# Patient Record
Sex: FEMALE | NOT HISPANIC OR LATINO | URBAN - METROPOLITAN AREA
[De-identification: names, ages, dates, MRNs, and addresses within clinical notes are randomized per-mention and may not be internally consistent; named-entity substitution may affect disease eponyms.]

---

## 2021-01-01 ENCOUNTER — INPATIENT (INPATIENT)
Facility: HOSPITAL | Age: 66
LOS: 1 days | DRG: 208 | End: 2021-11-24
Attending: INTERNAL MEDICINE | Admitting: INTERNAL MEDICINE
Payer: MEDICAID

## 2021-01-01 VITALS
SYSTOLIC BLOOD PRESSURE: 184 MMHG | WEIGHT: 235.01 LBS | TEMPERATURE: 98 F | OXYGEN SATURATION: 91 % | DIASTOLIC BLOOD PRESSURE: 74 MMHG | HEART RATE: 79 BPM | RESPIRATION RATE: 22 BRPM

## 2021-01-01 VITALS
SYSTOLIC BLOOD PRESSURE: 146 MMHG | RESPIRATION RATE: 20 BRPM | HEART RATE: 75 BPM | OXYGEN SATURATION: 95 % | TEMPERATURE: 98 F | DIASTOLIC BLOOD PRESSURE: 64 MMHG

## 2021-01-01 DIAGNOSIS — E11.8 TYPE 2 DIABETES MELLITUS WITH UNSPECIFIED COMPLICATIONS: ICD-10-CM

## 2021-01-01 DIAGNOSIS — R06.00 DYSPNEA, UNSPECIFIED: ICD-10-CM

## 2021-01-01 DIAGNOSIS — I10 ESSENTIAL (PRIMARY) HYPERTENSION: ICD-10-CM

## 2021-01-01 DIAGNOSIS — R09.02 HYPOXEMIA: ICD-10-CM

## 2021-01-01 LAB
A1C WITH ESTIMATED AVERAGE GLUCOSE RESULT: 7.1 % — HIGH (ref 4–5.6)
ALBUMIN SERPL ELPH-MCNC: 4 G/DL — SIGNIFICANT CHANGE UP (ref 3.3–5)
ALBUMIN SERPL ELPH-MCNC: 4.3 G/DL — SIGNIFICANT CHANGE UP (ref 3.3–5)
ALBUMIN SERPL ELPH-MCNC: 4.3 G/DL — SIGNIFICANT CHANGE UP (ref 3.3–5)
ALP SERPL-CCNC: 52 U/L — SIGNIFICANT CHANGE UP (ref 40–120)
ALP SERPL-CCNC: 65 U/L — SIGNIFICANT CHANGE UP (ref 40–120)
ALP SERPL-CCNC: 66 U/L — SIGNIFICANT CHANGE UP (ref 40–120)
ALT FLD-CCNC: 37 U/L — SIGNIFICANT CHANGE UP (ref 10–45)
ALT FLD-CCNC: 42 U/L — SIGNIFICANT CHANGE UP (ref 10–45)
ALT FLD-CCNC: 44 U/L — SIGNIFICANT CHANGE UP (ref 10–45)
ANION GAP SERPL CALC-SCNC: 12 MMOL/L — SIGNIFICANT CHANGE UP (ref 5–17)
ANION GAP SERPL CALC-SCNC: 13 MMOL/L — SIGNIFICANT CHANGE UP (ref 5–17)
APTT BLD: 30.9 SEC — SIGNIFICANT CHANGE UP (ref 27.5–35.5)
AST SERPL-CCNC: 114 U/L — HIGH (ref 10–40)
AST SERPL-CCNC: 17 U/L — SIGNIFICANT CHANGE UP (ref 10–40)
AST SERPL-CCNC: 21 U/L — SIGNIFICANT CHANGE UP (ref 10–40)
BASE EXCESS BLDV CALC-SCNC: 3.5 MMOL/L — HIGH (ref -2–2)
BASOPHILS # BLD AUTO: 0.05 K/UL — SIGNIFICANT CHANGE UP (ref 0–0.2)
BASOPHILS NFR BLD AUTO: 0.5 % — SIGNIFICANT CHANGE UP (ref 0–2)
BILIRUB SERPL-MCNC: 0.4 MG/DL — SIGNIFICANT CHANGE UP (ref 0.2–1.2)
BILIRUB SERPL-MCNC: 0.4 MG/DL — SIGNIFICANT CHANGE UP (ref 0.2–1.2)
BILIRUB SERPL-MCNC: 0.5 MG/DL — SIGNIFICANT CHANGE UP (ref 0.2–1.2)
BUN SERPL-MCNC: 14 MG/DL — SIGNIFICANT CHANGE UP (ref 7–23)
BUN SERPL-MCNC: 14 MG/DL — SIGNIFICANT CHANGE UP (ref 7–23)
BUN SERPL-MCNC: 16 MG/DL — SIGNIFICANT CHANGE UP (ref 7–23)
BUN SERPL-MCNC: 24 MG/DL — HIGH (ref 7–23)
CA-I SERPL-SCNC: 1.11 MMOL/L — LOW (ref 1.15–1.33)
CALCIUM SERPL-MCNC: 9.1 MG/DL — SIGNIFICANT CHANGE UP (ref 8.4–10.5)
CALCIUM SERPL-MCNC: 9.5 MG/DL — SIGNIFICANT CHANGE UP (ref 8.4–10.5)
CHLORIDE BLDV-SCNC: 104 MMOL/L — SIGNIFICANT CHANGE UP (ref 96–108)
CHLORIDE SERPL-SCNC: 103 MMOL/L — SIGNIFICANT CHANGE UP (ref 96–108)
CHLORIDE SERPL-SCNC: 103 MMOL/L — SIGNIFICANT CHANGE UP (ref 96–108)
CHLORIDE SERPL-SCNC: 104 MMOL/L — SIGNIFICANT CHANGE UP (ref 96–108)
CHLORIDE SERPL-SCNC: 99 MMOL/L — SIGNIFICANT CHANGE UP (ref 96–108)
CO2 BLDV-SCNC: 32 MMOL/L — HIGH (ref 22–26)
CO2 SERPL-SCNC: 24 MMOL/L — SIGNIFICANT CHANGE UP (ref 22–31)
CO2 SERPL-SCNC: 25 MMOL/L — SIGNIFICANT CHANGE UP (ref 22–31)
CO2 SERPL-SCNC: 26 MMOL/L — SIGNIFICANT CHANGE UP (ref 22–31)
CO2 SERPL-SCNC: 26 MMOL/L — SIGNIFICANT CHANGE UP (ref 22–31)
COVID-19 NUCLEOCAPSID GAM AB INTERP: POSITIVE
COVID-19 NUCLEOCAPSID TOTAL GAM ANTIBODY RESULT: 7.28 INDEX — HIGH
COVID-19 SPIKE DOMAIN AB INTERP: POSITIVE
COVID-19 SPIKE DOMAIN ANTIBODY RESULT: >250 U/ML — HIGH
CREAT SERPL-MCNC: 0.67 MG/DL — SIGNIFICANT CHANGE UP (ref 0.5–1.3)
CREAT SERPL-MCNC: 0.69 MG/DL — SIGNIFICANT CHANGE UP (ref 0.5–1.3)
CREAT SERPL-MCNC: 0.76 MG/DL — SIGNIFICANT CHANGE UP (ref 0.5–1.3)
CREAT SERPL-MCNC: 0.81 MG/DL — SIGNIFICANT CHANGE UP (ref 0.5–1.3)
EOSINOPHIL # BLD AUTO: 0.19 K/UL — SIGNIFICANT CHANGE UP (ref 0–0.5)
EOSINOPHIL NFR BLD AUTO: 2 % — SIGNIFICANT CHANGE UP (ref 0–6)
ESTIMATED AVERAGE GLUCOSE: 157 MG/DL — HIGH (ref 68–114)
GAS PNL BLDV: 136 MMOL/L — SIGNIFICANT CHANGE UP (ref 136–145)
GAS PNL BLDV: SIGNIFICANT CHANGE UP
GAS PNL BLDV: SIGNIFICANT CHANGE UP
GLUCOSE BLDC GLUCOMTR-MCNC: 111 MG/DL — HIGH (ref 70–99)
GLUCOSE BLDC GLUCOMTR-MCNC: 173 MG/DL — HIGH (ref 70–99)
GLUCOSE BLDC GLUCOMTR-MCNC: 191 MG/DL — HIGH (ref 70–99)
GLUCOSE BLDC GLUCOMTR-MCNC: 198 MG/DL — HIGH (ref 70–99)
GLUCOSE BLDC GLUCOMTR-MCNC: 201 MG/DL — HIGH (ref 70–99)
GLUCOSE BLDC GLUCOMTR-MCNC: 215 MG/DL — HIGH (ref 70–99)
GLUCOSE BLDC GLUCOMTR-MCNC: 231 MG/DL — HIGH (ref 70–99)
GLUCOSE BLDC GLUCOMTR-MCNC: 233 MG/DL — HIGH (ref 70–99)
GLUCOSE BLDV-MCNC: 160 MG/DL — HIGH (ref 70–99)
GLUCOSE SERPL-MCNC: 131 MG/DL — HIGH (ref 70–99)
GLUCOSE SERPL-MCNC: 155 MG/DL — HIGH (ref 70–99)
GLUCOSE SERPL-MCNC: 175 MG/DL — HIGH (ref 70–99)
GLUCOSE SERPL-MCNC: 192 MG/DL — HIGH (ref 70–99)
HCO3 BLDV-SCNC: 30 MMOL/L — HIGH (ref 22–29)
HCT VFR BLD CALC: 39 % — SIGNIFICANT CHANGE UP (ref 34.5–45)
HCT VFR BLD CALC: 39.7 % — SIGNIFICANT CHANGE UP (ref 34.5–45)
HCT VFR BLDA CALC: 34 % — LOW (ref 34.5–46.5)
HCV AB S/CO SERPL IA: 0.1 S/CO — SIGNIFICANT CHANGE UP (ref 0–0.99)
HCV AB SERPL-IMP: SIGNIFICANT CHANGE UP
HGB BLD CALC-MCNC: 11.3 G/DL — LOW (ref 11.7–16.1)
HGB BLD-MCNC: 11.2 G/DL — LOW (ref 11.5–15.5)
HGB BLD-MCNC: 11.2 G/DL — LOW (ref 11.5–15.5)
IMM GRANULOCYTES NFR BLD AUTO: 0.7 % — SIGNIFICANT CHANGE UP (ref 0–1.5)
INR BLD: 1.11 RATIO — SIGNIFICANT CHANGE UP (ref 0.88–1.16)
LACTATE BLDV-MCNC: 2 MMOL/L — SIGNIFICANT CHANGE UP (ref 0.7–2)
LYMPHOCYTES # BLD AUTO: 1.71 K/UL — SIGNIFICANT CHANGE UP (ref 1–3.3)
LYMPHOCYTES # BLD AUTO: 18 % — SIGNIFICANT CHANGE UP (ref 13–44)
MAGNESIUM SERPL-MCNC: 2.2 MG/DL — SIGNIFICANT CHANGE UP (ref 1.6–2.6)
MCHC RBC-ENTMCNC: 22.6 PG — LOW (ref 27–34)
MCHC RBC-ENTMCNC: 22.9 PG — LOW (ref 27–34)
MCHC RBC-ENTMCNC: 28.2 GM/DL — LOW (ref 32–36)
MCHC RBC-ENTMCNC: 28.7 GM/DL — LOW (ref 32–36)
MCV RBC AUTO: 78.6 FL — LOW (ref 80–100)
MCV RBC AUTO: 81 FL — SIGNIFICANT CHANGE UP (ref 80–100)
MONOCYTES # BLD AUTO: 0.65 K/UL — SIGNIFICANT CHANGE UP (ref 0–0.9)
MONOCYTES NFR BLD AUTO: 6.8 % — SIGNIFICANT CHANGE UP (ref 2–14)
NEUTROPHILS # BLD AUTO: 6.83 K/UL — SIGNIFICANT CHANGE UP (ref 1.8–7.4)
NEUTROPHILS NFR BLD AUTO: 72 % — SIGNIFICANT CHANGE UP (ref 43–77)
NRBC # BLD: 0 /100 WBCS — SIGNIFICANT CHANGE UP (ref 0–0)
NRBC # BLD: 0 /100 WBCS — SIGNIFICANT CHANGE UP (ref 0–0)
NT-PROBNP SERPL-SCNC: 165 PG/ML — SIGNIFICANT CHANGE UP (ref 0–300)
PCO2 BLDV: 53 MMHG — HIGH (ref 39–42)
PH BLDV: 7.36 — SIGNIFICANT CHANGE UP (ref 7.32–7.43)
PLATELET # BLD AUTO: 278 K/UL — SIGNIFICANT CHANGE UP (ref 150–400)
PLATELET # BLD AUTO: 280 K/UL — SIGNIFICANT CHANGE UP (ref 150–400)
PO2 BLDV: 42 MMHG — SIGNIFICANT CHANGE UP (ref 25–45)
POTASSIUM BLDV-SCNC: 7.4 MMOL/L — CRITICAL HIGH (ref 3.5–5.1)
POTASSIUM SERPL-MCNC: 3.5 MMOL/L — SIGNIFICANT CHANGE UP (ref 3.5–5.3)
POTASSIUM SERPL-MCNC: 4 MMOL/L — SIGNIFICANT CHANGE UP (ref 3.5–5.3)
POTASSIUM SERPL-MCNC: 4 MMOL/L — SIGNIFICANT CHANGE UP (ref 3.5–5.3)
POTASSIUM SERPL-MCNC: >9 MMOL/L — CRITICAL HIGH (ref 3.5–5.3)
POTASSIUM SERPL-SCNC: 3.5 MMOL/L — SIGNIFICANT CHANGE UP (ref 3.5–5.3)
POTASSIUM SERPL-SCNC: 4 MMOL/L — SIGNIFICANT CHANGE UP (ref 3.5–5.3)
POTASSIUM SERPL-SCNC: 4 MMOL/L — SIGNIFICANT CHANGE UP (ref 3.5–5.3)
POTASSIUM SERPL-SCNC: >9 MMOL/L — CRITICAL HIGH (ref 3.5–5.3)
PROT SERPL-MCNC: 7.4 G/DL — SIGNIFICANT CHANGE UP (ref 6–8.3)
PROT SERPL-MCNC: 7.8 G/DL — SIGNIFICANT CHANGE UP (ref 6–8.3)
PROT SERPL-MCNC: 8.6 G/DL — HIGH (ref 6–8.3)
PROTHROM AB SERPL-ACNC: 13.2 SEC — SIGNIFICANT CHANGE UP (ref 10.6–13.6)
RAPID RVP RESULT: SIGNIFICANT CHANGE UP
RBC # BLD: 4.9 M/UL — SIGNIFICANT CHANGE UP (ref 3.8–5.2)
RBC # BLD: 4.96 M/UL — SIGNIFICANT CHANGE UP (ref 3.8–5.2)
RBC # FLD: 17.1 % — HIGH (ref 10.3–14.5)
RBC # FLD: 17.2 % — HIGH (ref 10.3–14.5)
SAO2 % BLDV: 66.9 % — LOW (ref 67–88)
SARS-COV-2 IGG+IGM SERPL QL IA: 7.28 INDEX — HIGH
SARS-COV-2 IGG+IGM SERPL QL IA: >250 U/ML — HIGH
SARS-COV-2 IGG+IGM SERPL QL IA: POSITIVE
SARS-COV-2 IGG+IGM SERPL QL IA: POSITIVE
SARS-COV-2 RNA SPEC QL NAA+PROBE: SIGNIFICANT CHANGE UP
SARS-COV-2 RNA SPEC QL NAA+PROBE: SIGNIFICANT CHANGE UP
SODIUM SERPL-SCNC: 135 MMOL/L — SIGNIFICANT CHANGE UP (ref 135–145)
SODIUM SERPL-SCNC: 142 MMOL/L — SIGNIFICANT CHANGE UP (ref 135–145)
TROPONIN T, HIGH SENSITIVITY RESULT: 11 NG/L — SIGNIFICANT CHANGE UP (ref 0–51)
TROPONIN T, HIGH SENSITIVITY RESULT: 8 NG/L — SIGNIFICANT CHANGE UP (ref 0–51)
WBC # BLD: 9.34 K/UL — SIGNIFICANT CHANGE UP (ref 3.8–10.5)
WBC # BLD: 9.5 K/UL — SIGNIFICANT CHANGE UP (ref 3.8–10.5)
WBC # FLD AUTO: 9.34 K/UL — SIGNIFICANT CHANGE UP (ref 3.8–10.5)
WBC # FLD AUTO: 9.5 K/UL — SIGNIFICANT CHANGE UP (ref 3.8–10.5)

## 2021-01-01 PROCEDURE — 71045 X-RAY EXAM CHEST 1 VIEW: CPT | Mod: 26

## 2021-01-01 PROCEDURE — 99285 EMERGENCY DEPT VISIT HI MDM: CPT

## 2021-01-01 RX ORDER — INSULIN LISPRO 100/ML
VIAL (ML) SUBCUTANEOUS
Refills: 0 | Status: DISCONTINUED | OUTPATIENT
Start: 2021-01-01 | End: 2021-01-01

## 2021-01-01 RX ORDER — SODIUM CHLORIDE 9 MG/ML
1000 INJECTION, SOLUTION INTRAVENOUS
Refills: 0 | Status: DISCONTINUED | OUTPATIENT
Start: 2021-01-01 | End: 2021-01-01

## 2021-01-01 RX ORDER — SPIRONOLACTONE 25 MG/1
25 TABLET, FILM COATED ORAL DAILY
Refills: 0 | Status: DISCONTINUED | OUTPATIENT
Start: 2021-01-01 | End: 2021-01-01

## 2021-01-01 RX ORDER — DEXTROSE 50 % IN WATER 50 %
25 SYRINGE (ML) INTRAVENOUS ONCE
Refills: 0 | Status: DISCONTINUED | OUTPATIENT
Start: 2021-01-01 | End: 2021-01-01

## 2021-01-01 RX ORDER — GLUCAGON INJECTION, SOLUTION 0.5 MG/.1ML
1 INJECTION, SOLUTION SUBCUTANEOUS ONCE
Refills: 0 | Status: DISCONTINUED | OUTPATIENT
Start: 2021-01-01 | End: 2021-01-01

## 2021-01-01 RX ORDER — LOSARTAN POTASSIUM 100 MG/1
100 TABLET, FILM COATED ORAL DAILY
Refills: 0 | Status: DISCONTINUED | OUTPATIENT
Start: 2021-01-01 | End: 2021-01-01

## 2021-01-01 RX ORDER — FUROSEMIDE 40 MG
40 TABLET ORAL DAILY
Refills: 0 | Status: DISCONTINUED | OUTPATIENT
Start: 2021-01-01 | End: 2021-01-01

## 2021-01-01 RX ORDER — SPIRONOLACTONE 25 MG/1
1 TABLET, FILM COATED ORAL
Qty: 0 | Refills: 0 | DISCHARGE

## 2021-01-01 RX ORDER — DEXTROSE 50 % IN WATER 50 %
15 SYRINGE (ML) INTRAVENOUS ONCE
Refills: 0 | Status: DISCONTINUED | OUTPATIENT
Start: 2021-01-01 | End: 2021-01-01

## 2021-01-01 RX ORDER — ATENOLOL 25 MG/1
1 TABLET ORAL
Qty: 0 | Refills: 0 | DISCHARGE

## 2021-01-01 RX ORDER — ATENOLOL 25 MG/1
100 TABLET ORAL DAILY
Refills: 0 | Status: DISCONTINUED | OUTPATIENT
Start: 2021-01-01 | End: 2021-01-01

## 2021-01-01 RX ORDER — EMPAGLIFLOZIN 10 MG/1
1 TABLET, FILM COATED ORAL
Qty: 0 | Refills: 0 | DISCHARGE

## 2021-01-01 RX ORDER — CALCIUM GLUCONATE 100 MG/ML
2 VIAL (ML) INTRAVENOUS ONCE
Refills: 0 | Status: DISCONTINUED | OUTPATIENT
Start: 2021-01-01 | End: 2021-01-01

## 2021-01-01 RX ORDER — DEXTROSE 50 % IN WATER 50 %
12.5 SYRINGE (ML) INTRAVENOUS ONCE
Refills: 0 | Status: DISCONTINUED | OUTPATIENT
Start: 2021-01-01 | End: 2021-01-01

## 2021-01-01 RX ORDER — INFLUENZA VIRUS VACCINE 15; 15; 15; 15 UG/.5ML; UG/.5ML; UG/.5ML; UG/.5ML
0.7 SUSPENSION INTRAMUSCULAR ONCE
Refills: 0 | Status: DISCONTINUED | OUTPATIENT
Start: 2021-01-01 | End: 2021-01-01

## 2021-01-01 RX ORDER — HEPARIN SODIUM 5000 [USP'U]/ML
5000 INJECTION INTRAVENOUS; SUBCUTANEOUS EVERY 12 HOURS
Refills: 0 | Status: DISCONTINUED | OUTPATIENT
Start: 2021-01-01 | End: 2021-01-01

## 2021-01-01 RX ORDER — LOSARTAN POTASSIUM 100 MG/1
1 TABLET, FILM COATED ORAL
Qty: 0 | Refills: 0 | DISCHARGE

## 2021-01-01 RX ORDER — VITAMIN E 100 UNIT
0 CAPSULE ORAL
Qty: 0 | Refills: 0 | DISCHARGE

## 2021-01-01 RX ORDER — INSULIN LISPRO 100/ML
VIAL (ML) SUBCUTANEOUS AT BEDTIME
Refills: 0 | Status: DISCONTINUED | OUTPATIENT
Start: 2021-01-01 | End: 2021-01-01

## 2021-01-01 RX ORDER — SITAGLIPTIN AND METFORMIN HYDROCHLORIDE 500; 50 MG/1; MG/1
2 TABLET, FILM COATED ORAL
Qty: 0 | Refills: 0 | DISCHARGE

## 2021-01-01 RX ORDER — LEVOTHYROXINE SODIUM 125 MCG
125 TABLET ORAL DAILY
Refills: 0 | Status: DISCONTINUED | OUTPATIENT
Start: 2021-01-01 | End: 2021-01-01

## 2021-01-01 RX ORDER — ALBUTEROL 90 UG/1
2 AEROSOL, METERED ORAL
Refills: 0 | Status: DISCONTINUED | OUTPATIENT
Start: 2021-01-01 | End: 2021-01-01

## 2021-01-01 RX ORDER — LEVOTHYROXINE SODIUM 125 MCG
0.5 TABLET ORAL
Qty: 0 | Refills: 0 | DISCHARGE

## 2021-01-01 RX ADMIN — HEPARIN SODIUM 5000 UNIT(S): 5000 INJECTION INTRAVENOUS; SUBCUTANEOUS at 17:34

## 2021-01-01 RX ADMIN — Medication 0.1 MILLIGRAM(S): at 05:37

## 2021-01-01 RX ADMIN — ATENOLOL 100 MILLIGRAM(S): 25 TABLET ORAL at 05:06

## 2021-01-01 RX ADMIN — LOSARTAN POTASSIUM 100 MILLIGRAM(S): 100 TABLET, FILM COATED ORAL at 05:37

## 2021-01-01 RX ADMIN — Medication 1: at 18:15

## 2021-01-01 RX ADMIN — SPIRONOLACTONE 25 MILLIGRAM(S): 25 TABLET, FILM COATED ORAL at 05:38

## 2021-01-01 RX ADMIN — SPIRONOLACTONE 25 MILLIGRAM(S): 25 TABLET, FILM COATED ORAL at 05:06

## 2021-01-01 RX ADMIN — ATENOLOL 100 MILLIGRAM(S): 25 TABLET ORAL at 05:38

## 2021-01-01 RX ADMIN — HEPARIN SODIUM 5000 UNIT(S): 5000 INJECTION INTRAVENOUS; SUBCUTANEOUS at 05:38

## 2021-01-01 RX ADMIN — HEPARIN SODIUM 5000 UNIT(S): 5000 INJECTION INTRAVENOUS; SUBCUTANEOUS at 05:05

## 2021-01-01 RX ADMIN — Medication 2: at 13:40

## 2021-01-01 RX ADMIN — Medication 125 MICROGRAM(S): at 05:06

## 2021-01-01 RX ADMIN — Medication 0.1 MILLIGRAM(S): at 05:06

## 2021-01-01 RX ADMIN — Medication 40 MILLIGRAM(S): at 05:06

## 2021-01-01 RX ADMIN — Medication 40 MILLIGRAM(S): at 17:32

## 2021-01-01 RX ADMIN — LOSARTAN POTASSIUM 100 MILLIGRAM(S): 100 TABLET, FILM COATED ORAL at 05:06

## 2021-01-01 RX ADMIN — Medication 1: at 09:20

## 2021-01-01 RX ADMIN — Medication 1: at 09:26

## 2021-01-01 RX ADMIN — Medication 2: at 13:19

## 2021-01-01 RX ADMIN — Medication 125 MICROGRAM(S): at 05:37

## 2021-11-22 NOTE — ED PROVIDER NOTE - ATTENDING CONTRIBUTION TO CARE
66 F w/ breast malignancy s/p resection,   sudden onset sob, w/ cp   88 percent  2 puffs of albuterol and then sat improved  no cp    bilateral trace edema 66 F w/ L breast malignancy s/p resection, HTN, DM follows w NP Barbara wu [facility], here w/ an episode of sudden onset sob w/ cp pt states that she was about to get in an suv and was having   88 percent  2 puffs of albuterol and then sat improved  no cp    bilateral trace edema 66 F w/ L breast malignancy s/p resection, HTN, DM follows w NP Barbara w [facility], here w/ an episode of sudden onset sob w/ cp pt states that she was about to get in an suv and then had worsening symptoms. Pt denies any nausea no vomiting. No fevers, no chills, no lightheadedness,    88 percent   2 puffs of albuterol and then sat improved to the 90s, pt w/ initially having tachypnea and sob.   on exam, Const: appearing stated age, no acute distress Head: atraumatic, normocephalic  Eyes: no conjunctival injection and no scleral icterus ENMT: Atraumatic external nose and ears, Moist mucus membranes Neck: Symmetric, trachea midline, BACK: no bruising, CVS: +S1/S2, radial pulse 2+ bilaterally RESP: wheezing in the JONATHAN, w/ Unlabored respiratory effort, Clear to auscultation bilaterally GI: Nontender/Nondistended, soft abdomen MSK: Extremities w/o deformity or ttp w/ bilateral trace edema  Skin: Warm, Dry w/ no rashes Neuro: GCS=15, motor in all 4 extremities equal, Sensation grossly intact Psych: Awake, Alert, & Orientedx3;  Appropriate mood and affect, cooperative   Plan for labs imaging and reassessment. Pt w/ findings concerning for chf vs less likely PE vs reactive airway disease, pt hypoxic on RA, to be admitted to medicine for further management of sx eval for acs,  states she hasn't had an echo done recently, no prior stress test.

## 2021-11-22 NOTE — ED PROVIDER NOTE - NS ED ROS FT
GENERAL: No fever or chills  EYES: No change in vision  HEENT: No trouble swallowing or speaking  CARDIAC: +palpitations  PULMONARY: +SOB, hypoxia  GI: No abdominal pain, no nausea or no vomiting, no diarrhea or constipation  : No changes in urination  SKIN: No rashes  NEURO: No headache, no numbness  MSK: No joint pain  Otherwise as HPI or negative.

## 2021-11-22 NOTE — ED PROVIDER NOTE - CLINICAL SUMMARY MEDICAL DECISION MAKING FREE TEXT BOX
Cecil Singer MD (PGY-2): The patient is a 66y Female with pmhx of breast malignancy with L mastectomy, HTN, DM2 complaining of sudden onset shortness of breath and hypoxia. DDx Cecil Singer MD (PGY-2): The patient is a 66y Female with pmhx of breast malignancy with L mastectomy, HTN, DM2 complaining of sudden onset shortness of breath and hypoxia. DDx: new onset CHF, PE, reactive airway disease. ekg, cxr, trop, d-dimer, bnp, labs, oxygen via NC. Pt hypoxic on RA. Satting 96% on 2L O2 via NC. Pt will most likely be admitted.

## 2021-11-22 NOTE — ED PROVIDER NOTE - PHYSICAL EXAMINATION
Gen: NAD. A&Ox4. Non-toxic appearing.  HEENT: Normocephalic and atraumatic. PERRL, EOMI, no nasal discharge, mucous membranes dry, no scleral icterus.  CV: Regular rate and rhythm. +S1/S2, no M/R/G. Trace pedal/ankle edema bilaterally. Radial and DP pulses present and symmetrical. Capillary refill less than 2 seconds.  Resp: Mildly increased effort and rate (low 20s). CTAB, no rales, rhonchi, or wheezes.  GI: Abdomen soft, non-distended, non tender to palpation. No masses appreciated. Bowel sounds present.  MSK: No open wounds and no bruising. No CVAT bilaterally.  Neuro: Following commands, speaking in full sentences, moving extremities spontaneously  Psych: Appropriate mood, cooperative

## 2021-11-22 NOTE — ED PROVIDER NOTE - OBJECTIVE STATEMENT
The patient is a 66y Female with pmhx of breast malignancy with L mastectomy, HTN, DM2 complaining of sudden onset shortness of breath. Around 3 hours ago, pt was getting into SUV to go to airport, suddenly felt SOB with heart palpitations, but denied CP at the time. Pulse ox at the time was 88%. Her nephew came over and saw that pulse ox was 78%. Gave her 2 puffs of albuterol which improved pulse ox to 88. Pt not on any baseline O2 at home. Has hx of asthma, but hasn't had to take medicine for years. No hx of CHF, DVT/PE. Denies any leg swelling. Said her last echo was 2-3 years ago, was told heart function was normal. Doesn't have cardiologist at this time. Denies any current CP, sob, palpitations. Allergic to Norvasc and hydralazine. The patient is a 66y Female with pmhx of breast malignancy with L mastectomy, HTN, DM2 complaining of sudden onset shortness of breath and hypoxia. Around 3 hours ago, pt was getting into SUV to go to airport, suddenly felt SOB with heart palpitations, but denied CP at the time. Pulse ox at the time was 88%. Her nephew came over and saw that pulse ox was 78%. Gave her 2 puffs of albuterol which improved pulse ox to 88. Pt not on any baseline O2 at home. Has hx of asthma, but hasn't had to take medicine for years. No hx of CHF, DVT/PE. Denies any leg swelling. Said her last echo was 2-3 years ago, was told heart function was normal. Doesn't have cardiologist at this time. Denies any current CP, sob, palpitations. Allergic to Norvasc and hydralazine.

## 2021-11-22 NOTE — ED ADULT NURSE NOTE - OBJECTIVE STATEMENT
66 y f came to the ed c/o shortness of breath. patient states around 630 this morning she started to feel sob which gradually got worse. last week her MD stopped her bumex due to dehydration as per patient. sob is worse with activity but improves with rest. patient is a/ox3. denies fevers, chills, chest pain. abdomen is soft and nontender. no lower extremity edema noticed on exam. skin is warm and dry.

## 2021-11-22 NOTE — H&P ADULT - HISTORY OF PRESENT ILLNESS
The patient is a 66y Female with pmhx of breast malignancy with L mastectomy, HTN, DM2 complaining of sudden onset shortness of breath and hypoxia. Around 3 hours ago, pt was getting into SUV [place] go to airport, suddenly felt SOB with heart palpitations, but denied CP at the time. Pulse ox at the time was 88%. Her nephew came over and saw that pulse ox was 78%. Gave her 2 puffs of albuterol which improved pulse ox to 88. Pt not on any baseline O2 at home. Has hx of asthma, but hasn't had to take medicine for years. No hx of CHF, DVT/PE. Denies any leg swelling. Said her last echo was 2-3 years ago, was told heart function was normal.

## 2021-11-22 NOTE — ED ADULT NURSE NOTE - NS ED NOTE  TALK SOMEONE YN
Detail Level: Detailed Wound Evaluated By: Bentley Diehl M.D. Body Location Override (Optional - Billing Will Still Be Based On Selected Body Map Location If Applicable): right upper medial back No

## 2021-11-23 NOTE — PROGRESS NOTE ADULT - SUBJECTIVE AND OBJECTIVE BOX
Patient is a 66y old  Female who presents with a chief complaint of     HPI:  Exertional dyspnea ++, with hypoxia    PAST MEDICAL & SURGICAL HISTORY:      Review of Systems:   CONSTITUTIONAL: No fever, weight loss, or fatigue  EYES: No eye pain, visual disturbances, or discharge  ENMT:  No difficulty hearing, tinnitus, vertigo; No sinus or throat pain  NECK: No pain or stiffness  BREASTS: No pain, masses, or nipple discharge  RESPIRATORY: No cough, wheezing, chills or hemoptysis; +shortness of breath  CARDIOVASCULAR: No chest pain, palpitations, dizziness, or leg swelling  GASTROINTESTINAL: No abdominal or epigastric pain. No nausea, vomiting, or hematemesis; No diarrhea or constipation. No melena or hematochezia.  GENITOURINARY: No dysuria, frequency, hematuria, or incontinence  NEUROLOGICAL: No headaches, memory loss, loss of strength, numbness, or tremors  SKIN: No itching, burning, rashes, or lesions   LYMPH NODES: No enlarged glands  ENDOCRINE: No heat or cold intolerance; No hair loss  MUSCULOSKELETAL: No joint pain or swelling; No muscle, back, or extremity pain  PSYCHIATRIC: No depression, anxiety, mood swings, or difficulty sleeping  HEME/LYMPH: No easy bruising, or bleeding gums  ALLERY AND IMMUNOLOGIC: No hives or eczema    Allergies    hydrALAZINE (Unknown)  Norvasc (Unknown)    Intolerances        Social History:     FAMILY HISTORY:      MEDICATIONS  (STANDING):  ATENolol  Tablet 100 milliGRAM(s) Oral daily  cloNIDine 0.1 milliGRAM(s) Oral daily  dextrose 40% Gel 15 Gram(s) Oral once  dextrose 5%. 1000 milliLiter(s) (50 mL/Hr) IV Continuous <Continuous>  dextrose 5%. 1000 milliLiter(s) (100 mL/Hr) IV Continuous <Continuous>  dextrose 50% Injectable 25 Gram(s) IV Push once  dextrose 50% Injectable 12.5 Gram(s) IV Push once  dextrose 50% Injectable 25 Gram(s) IV Push once  furosemide   Injectable 40 milliGRAM(s) IV Push daily  glucagon  Injectable 1 milliGRAM(s) IntraMuscular once  heparin   Injectable 5000 Unit(s) SubCutaneous every 12 hours  influenza  Vaccine (HIGH DOSE) 0.7 milliLiter(s) IntraMuscular once  insulin lispro (ADMELOG) corrective regimen sliding scale   SubCutaneous three times a day before meals  insulin lispro (ADMELOG) corrective regimen sliding scale   SubCutaneous at bedtime  levothyroxine 125 MICROGram(s) Oral daily  losartan 100 milliGRAM(s) Oral daily  spironolactone 25 milliGRAM(s) Oral daily    MEDICATIONS  (PRN):  ALBUTerol    90 MICROgram(s) HFA Inhaler 2 Puff(s) Inhalation four times a day PRN Shortness of Breath and/or Wheezing        CAPILLARY BLOOD GLUCOSE  220 (22 Nov 2021 21:20)      POCT Blood Glucose.: 198 mg/dL (23 Nov 2021 17:20)  POCT Blood Glucose.: 215 mg/dL (23 Nov 2021 13:09)  POCT Blood Glucose.: 173 mg/dL (23 Nov 2021 08:54)  POCT Blood Glucose.: 201 mg/dL (22 Nov 2021 21:18)    I&O's Summary    22 Nov 2021 07:01  -  23 Nov 2021 07:00  --------------------------------------------------------  IN: 0 mL / OUT: 450 mL / NET: -450 mL    23 Nov 2021 07:01  -  23 Nov 2021 20:13  --------------------------------------------------------  IN: 600 mL / OUT: 0 mL / NET: 600 mL        PHYSICAL EXAM:  Vital Signs Last 24 Hrs  T(C): 36.3 (23 Nov 2021 12:07), Max: 36.7 (23 Nov 2021 04:23)  T(F): 97.4 (23 Nov 2021 12:07), Max: 98 (23 Nov 2021 04:23)  HR: 69 (23 Nov 2021 16:39) (67 - 70)  BP: 123/77 (23 Nov 2021 16:39) (120/76 - 160/70)  BP(mean): --  RR: 20 (23 Nov 2021 12:07) (18 - 20)  SpO2: 92% (23 Nov 2021 12:07) (92% - 97%)    GENERAL: NAD, well-developed  HEAD:  Atraumatic, Normocephalic  EYES: EOMI, PERRLA, conjunctiva and sclera clear  NECK: Supple, No JVD  CHEST/LUNG: Clear to auscultation bilaterally; No wheeze  HEART: Regular rate and rhythm; No murmurs, rubs, or gallops  ABDOMEN: Soft, Nontender, Nondistended; Bowel sounds present  EXTREMITIES:  2+ Peripheral Pulses, No clubbing, cyanosis, or edema  PSYCH: AAOx3  NEUROLOGY: non-focal  SKIN: No rashes or lesions    LABS:                        11.2   9.34  )-----------( 280      ( 23 Nov 2021 06:42 )             39.7     11-23    142  |  104  |  14  ----------------------------<  175<H>  4.0   |  25  |  0.76    Ca    9.5      23 Nov 2021 06:42  Phos  4.0     11-22  Mg     2.0     11-22    TPro  7.4  /  Alb  4.0  /  TBili  0.4  /  DBili  x   /  AST  17  /  ALT  37  /  AlkPhos  66  11-23    PT/INR - ( 22 Nov 2021 10:18 )   PT: 13.2 sec;   INR: 1.11 ratio         PTT - ( 22 Nov 2021 10:18 )  PTT:30.9 sec          RADIOLOGY & ADDITIONAL TESTS:    Imaging Personally Reviewed:    Consultant(s) Notes Reviewed:      Care Discussed with Consultants/Other Providers:

## 2021-11-24 NOTE — PHYSICAL THERAPY INITIAL EVALUATION ADULT - PERTINENT HX OF CURRENT PROBLEM, REHAB EVAL
66y Female with pmhx of breast malignancy with L mastectomy, HTN, DM2 complaining of sudden onset shortness of breath and hypoxia. In afternoon on 11/22/21 pt was getting into SUV [place] go to airport, suddenly felt SOB with heart palpitations, but denied CP at the time. Pulse ox at the time was 88%. Her nephew came over and saw that pulse ox was 78%. Gave her 2 puffs of albuterol which improved pulse ox to 88. Pt not on any baseline O2 at home.

## 2021-11-24 NOTE — PHYSICAL THERAPY INITIAL EVALUATION ADULT - PLANNED THERAPY INTERVENTIONS, PT EVAL
LTG 1: Stairs - Pt will be independent with negotiation of 4 steps w/ handrail within 4 weeks./balance training/bed mobility training/gait training/transfer training

## 2021-11-24 NOTE — DISCHARGE NOTE FOR THE EXPIRED PATIENT - HOSPITAL COURSE
66y Female with pmhx of breast malignancy with L mastectomy, HTN, DM2 complaining of sudden onset shortness of breath and hypoxia. Around 3 hours prior to admision, pt was getting into SUV to go to airport, suddenly felt SOB with heart palpitations, but denied CP at that time. Pulse ox at the time was 88%. Her nephew came over and saw that pulse ox was 78%. Gave her 2 puffs of albuterol which improved pulse ox to 88. Pt not on any baseline O2 at home. Has hx of asthma, but hasn't had to take medicine for years. No hx of CHF, DVT/PE. Denies any leg swelling. Said her last echo was 2-3 years ago, was told heart function was normal. Chest xray - clear lungs. Patient seen by pulm - d dimer is negative: she is not wheezing: but she has hx of asthma: not on regular meds at home; her VBG suggests OHS: She also has ISAEL but have not been using cpap at home for years!  probnp is low too: Echo and LE dopplers ordered, CT chest ordered. Patient ambulating to stretcher, become SOB, urinated and LOC with loss of pulse. CPR initiated and code blue called, unable to achieve ROSC. Patient pronounced at 1709 by Dr Scales

## 2021-11-24 NOTE — PHYSICAL THERAPY INITIAL EVALUATION ADULT - IMPAIRMENTS CONTRIBUTING TO GAIT DEVIATIONS, PT EVAL
Inpatient Anticoagulation Service Note    Date: 10/2/2020    Reason for Anticoagulation: Deep Vein Thrombosis, Pulmonary Embolism   Target INR: 2.0 to 3.0  [id number] VASc Score: 4  HAS-BLED Score: 4   Hemoglobin Value: 15  Hematocrit Value: 44.9  Lab Platelet Value: 335    INR from last 7 days     Date/Time INR Value    10/02/20 0424  (!) 1.69    10/01/20 1630  (!) 1.83        Dose from last 7 days     Date/Time Dose (mg)    10/02/20 6655  5        Significant Interactions: Statin, Aspirin, Thyroid Medications  Bridge Therapy: No (If less than 5 days and overlap therapy discontinued -- document reason (i.e. Bleed Risk))    (If still on overlap therapy, if No -- document reason (i.e. Bleed Risk))    Reversal Agent Administered: Not Applicable  Comments: INR 1.69 ,goal 2-3. Patient received bolus dose yesterday. Expect to see INR trend up in the next few days. No overt s/sx of bleeding noted in chart. DDI noted with home medications. Will give warfarin 7.5 mg PO X 1 with repeat INR in AM.    Plan:  Warfarin 7.5 mg PO X 1, repeat INR in AM  Education Material Provided?: No  Pharmacist suggested discharge dosing: Resume home dosing of warfarin 7.5 mg PO M,W,F and 3.75 mg ROW. Would recommend repeat INR w/i 48 hours of discharge     Renay Salcido, PharmD, BCPS             decreased strength

## 2021-11-24 NOTE — PROVIDER CONTACT NOTE (CHANGE IN STATUS NOTIFICATION) - SITUATION
[facility] No pulse upon assessment, CODE BLUE CALLED.

## 2021-11-24 NOTE — ED ADULT NURSE NOTE - NS ED NURSE REPORT GIVEN DT
22-Nov-2021 17:27 Methotrexate Pregnancy And Lactation Text: This medication is Pregnancy Category X and is known to cause fetal harm. This medication is excreted in breast milk.

## 2021-11-24 NOTE — DISCHARGE NOTE FOR THE EXPIRED PATIENT - NS EXPIRED ORGANCONFIRMRES
Detail Level: Detailed Quality 110: Preventive Care And Screening: Influenza Immunization: Influenza immunization was not ordered or administered, reason not given Quality 431: Preventive Care And Screening: Unhealthy Alcohol Use - Screening: Patient screened for unhealthy alcohol use using a single question and scores less than 2 times per year Yes

## 2021-11-24 NOTE — CONSULT NOTE ADULT - SUBJECTIVE AND OBJECTIVE BOX
11-24-21 @ 09:57    Patient is a 66y old  Female who presents with a chief complaint of     HPI:  The patient is a 66y Female with pmhx of breast malignancy with L mastectomy, HTN, DM2 complaining of sudden onset shortness of breath and hypoxia. Around 3 hours ago, pt was getting into SUV to go to airport, suddenly felt SOB with heart palpitations, but denied CP at the time. Pulse ox at the time was 88%. Her nephew came over and saw that pulse ox was 78%. Gave her 2 puffs of albuterol which improved pulse ox to 88. Pt not on any baseline O2 at home. Has hx of asthma, but hasn't had to take medicine for years. No hx of CHF, DVT/PE. Denies any leg swelling. Said her last echo was 2-3 years ago, was told heart function was normal.  (22 Nov 2021 23:16)    she says she has asthma but have not been taking any inhalers at home; she had no chest pain ; has some dry cough : not much of phlegm:   she has sleep apnea: used to use cpap before but now she has no tbeen using it: she does not use any oxygen at home: she is obese:  and has cough with phlegm but cant tell me what color is the phlegm!    never had pe or dvt : never had covid: got covid vaccinationx         ?FOLLOWING PRESENT  [ x] Hx of PE/DVT, [ ] Hx COPD, [ x] Hx of Asthma, [x ] Hx of Hospitalization, [y ]  Hx of BiPAP/CPAP use, [ y] Hx of ISAEL    Allergies    hydrALAZINE (Unknown)  Norvasc (Unknown)    Intolerances        PAST MEDICAL & SURGICAL HISTORY:      FAMILY HISTORY:      Social History: [ x ] TOBACCO                  [ x ] ETOH                                 [ x ] IVDA/DRUGS    REVIEW OF SYSTEMS      General:	x    Skin/Breast:x  	  Ophthalmologic:x  	  ENMT:	x    Respiratory and Thorax: sob, alarcon , cough   	  Cardiovascular:	x    Gastrointestinal:	x    Genitourinary:	x    Musculoskeletal:	x    Neurological:	x    Psychiatric:	x    Hematology/Lymphatics:	x    Endocrine:	x    Allergic/Immunologic:	x    MEDICATIONS  (STANDING):  ATENolol  Tablet 100 milliGRAM(s) Oral daily  cloNIDine 0.1 milliGRAM(s) Oral daily  dextrose 40% Gel 15 Gram(s) Oral once  dextrose 5%. 1000 milliLiter(s) (50 mL/Hr) IV Continuous <Continuous>  dextrose 5%. 1000 milliLiter(s) (100 mL/Hr) IV Continuous <Continuous>  dextrose 50% Injectable 25 Gram(s) IV Push once  dextrose 50% Injectable 12.5 Gram(s) IV Push once  dextrose 50% Injectable 25 Gram(s) IV Push once  furosemide   Injectable 40 milliGRAM(s) IV Push daily  glucagon  Injectable 1 milliGRAM(s) IntraMuscular once  heparin   Injectable 5000 Unit(s) SubCutaneous every 12 hours  influenza  Vaccine (HIGH DOSE) 0.7 milliLiter(s) IntraMuscular once  insulin lispro (ADMELOG) corrective regimen sliding scale   SubCutaneous three times a day before meals  insulin lispro (ADMELOG) corrective regimen sliding scale   SubCutaneous at bedtime  levothyroxine 125 MICROGram(s) Oral daily  losartan 100 milliGRAM(s) Oral daily  spironolactone 25 milliGRAM(s) Oral daily    MEDICATIONS  (PRN):  ALBUTerol    90 MICROgram(s) HFA Inhaler 2 Puff(s) Inhalation four times a day PRN Shortness of Breath and/or Wheezing       Vital Signs Last 24 Hrs  T(C): 36.9 (24 Nov 2021 04:22), Max: 36.9 (24 Nov 2021 04:22)  T(F): 98.5 (24 Nov 2021 04:22), Max: 98.5 (24 Nov 2021 04:22)  HR: 67 (24 Nov 2021 04:22) (67 - 72)  BP: 145/80 (24 Nov 2021 04:22) (120/76 - 145/80)  BP(mean): --  RR: 20 (24 Nov 2021 04:22) (20 - 20)  SpO2: 94% (24 Nov 2021 04:22) (92% - 94%)Orthostatic VS          I&O's Summary    23 Nov 2021 07:01  -  24 Nov 2021 07:00  --------------------------------------------------------  IN: 720 mL / OUT: 1050 mL / NET: -330 mL        Physical Exam:   GENERAL: Obese++  HEENT: KAYLIE/   Atraumatic, Normocephalic  ENMT: No tonsillar erythema, exudates, or enlargement; Moist mucous membranes, Good dentition, No lesions  NECK: Supple, No JVD, Normal thyroid  CHEST/LUNG: not much of wheezing to me: poor air entry bilaterally:   CVS: Regular rate and rhythm; No murmurs, rubs, or gallops  GI: : Soft, Nontender, Nondistended; Bowel sounds present  NERVOUS SYSTEM:  Alert & Oriented X3  EXTREMITIES:  Mid  edema  LYMPH: No lymphadenopathy noted  SKIN: No rashes or lesions  ENDOCRINOLOGY: No Thyromegaly  PSYCH: Appropriate    Labs:  Venous<53<4>>42<<7.365>>Venous<<3><<4><<5<<429>>SARS-CoV-2: Maicotec (22 Nov 2021 17:19)  COVID-19 PCR: Maicotec (22 Nov 2021 10:17)                              11.2   9.34  )-----------( 280      ( 23 Nov 2021 06:42 )             39.7                         11.2   9.50  )-----------( 278      ( 22 Nov 2021 10:18 )             39.0     11-24    142  |  103  |  24<H>  ----------------------------<  192<H>  4.0   |  26  |  0.81  11-23    142  |  104  |  14  ----------------------------<  175<H>  4.0   |  25  |  0.76  11-22    142  |  103  |  14  ----------------------------<  131<H>  3.5   |  26  |  0.69  11-22    135  |  99  |  16  ----------------------------<  155<H>  >9.0<HH>   |  24  |  0.67    Ca    9.5      24 Nov 2021 06:18  Ca    9.5      23 Nov 2021 06:42  Ca    9.5      22 Nov 2021 12:16  Ca    9.1      22 Nov 2021 10:18  Phos  4.0     11-22  Mg     2.2     11-24  Mg     2.0     11-22    TPro  7.4  /  Alb  4.0  /  TBili  0.4  /  DBili  x   /  AST  17  /  ALT  37  /  AlkPhos  66  11-23  TPro  7.8  /  Alb  4.3  /  TBili  0.4  /  DBili  x   /  AST  21  /  ALT  42  /  AlkPhos  65  11-22  TPro  8.6<H>  /  Alb  4.3  /  TBili  0.5  /  DBili  x   /  AST  114<H>  /  ALT  44  /  AlkPhos  52  11-22    CAPILLARY BLOOD GLUCOSE      POCT Blood Glucose.: 191 mg/dL (24 Nov 2021 09:08)  POCT Blood Glucose.: 231 mg/dL (23 Nov 2021 21:55)  POCT Blood Glucose.: 198 mg/dL (23 Nov 2021 17:20)  POCT Blood Glucose.: 215 mg/dL (23 Nov 2021 13:09)    LIVER FUNCTIONS - ( 23 Nov 2021 06:42 )  Alb: 4.0 g/dL / Pro: 7.4 g/dL / ALK PHOS: 66 U/L / ALT: 37 U/L / AST: 17 U/L / GGT: x           PT/INR - ( 22 Nov 2021 10:18 )   PT: 13.2 sec;   INR: 1.11 ratio         PTT - ( 22 Nov 2021 10:18 )  PTT:30.9 sec    D DImer  D-Dimer Assay, Quantitative: 199 ng/mL DDU (11-22 @ 10:18)  Serum Pro-Brain Natriuretic Peptide: 165 pg/mL (11-22 @ 10:18)      Studies  Chest X-RAY  CT SCAN Chest   CT Abdomen  Venous Dopplers: LE:   Others      rad< from: Xray Chest 1 View- PORTABLE-Urgent (11.22.21 @ 10:06) >    EXAM:  XR CHEST PORTABLE URGENT 1V                            PROCEDURE DATE:  11/22/2021            INTERPRETATION:  TECHNIQUE: A single AP view of the chest was obtained. Ordered time:   11/22/2021 10:06 AM    COMPARISON: None    CLINICAL INFORMATION: Shortness of breath    FINDINGS:  Surgical clips are seen in the left axilla.  Right subclavian catheter is noted with its tip in the SVC.  The heart is not well assessed on an AP film.  The lungs are clear.  There are no pleural effusions.  There is no pneumothorax.    IMPRESSION:    Clear lungs    --- End of Report ---                AKOSUA BURNS MD; Attending Radiologist  This document has been electronically signed. Nov 22 2021  1:23PM    < end of copied text >

## 2021-11-24 NOTE — CONSULT NOTE ADULT - PROBLEM SELECTOR RECOMMENDATION 9
her d dimer is negative: she is not wheezing: but she has hx of asthma: not on regular meds at home; her VBG suggests OHS: She also has ISAEL but have not been using cpap at home for years!    her probnp is low too:  would check echo too: check dopplers venous :     she will need ct chest non contrast:

## 2021-11-24 NOTE — PROVIDER CONTACT NOTE (CHANGE IN STATUS NOTIFICATION) - ASSESSMENT
Patient became unresponsive upon transfer to [facility]. No pulse upon assessment, CODE BLUE CALLED. See cardiac arrest data sheet.

## 2021-11-24 NOTE — PROCEDURE NOTE - ADDITIONAL PROCEDURE DETAILS
Called on STAT pager for emergency intubation. On arrival, CPR ongoing and bag mask ventilation with 100% FiO2 being performed. History briefly reviewed with housestaff.  DL x 1 w/ MAC 4 view by CRNA, DL x 1 with MAC 3 blade by attending, grade 2 view, 7.5 mm cuffed ETT passed without trauma, + ETCO2 via EasyCap, + b/l BS, taped at 22 cm, teeth intact, no complications. Ventilation with ambu bag by RT, waiting for vent.

## 2021-11-30 PROCEDURE — 88313 SPECIAL STAINS GROUP 2: CPT | Mod: 26

## 2022-01-06 PROCEDURE — 85018 HEMOGLOBIN: CPT

## 2022-01-06 PROCEDURE — 80048 BASIC METABOLIC PNL TOTAL CA: CPT

## 2022-01-06 PROCEDURE — 0225U NFCT DS DNA&RNA 21 SARSCOV2: CPT

## 2022-01-06 PROCEDURE — 85014 HEMATOCRIT: CPT

## 2022-01-06 PROCEDURE — 84295 ASSAY OF SERUM SODIUM: CPT

## 2022-01-06 PROCEDURE — 86769 SARS-COV-2 COVID-19 ANTIBODY: CPT

## 2022-01-06 PROCEDURE — 83036 HEMOGLOBIN GLYCOSYLATED A1C: CPT

## 2022-01-06 PROCEDURE — 85027 COMPLETE CBC AUTOMATED: CPT

## 2022-01-06 PROCEDURE — 84484 ASSAY OF TROPONIN QUANT: CPT

## 2022-01-06 PROCEDURE — 85025 COMPLETE CBC W/AUTO DIFF WBC: CPT

## 2022-01-06 PROCEDURE — 82947 ASSAY GLUCOSE BLOOD QUANT: CPT

## 2022-01-06 PROCEDURE — 80053 COMPREHEN METABOLIC PANEL: CPT

## 2022-01-06 PROCEDURE — 99285 EMERGENCY DEPT VISIT HI MDM: CPT

## 2022-01-06 PROCEDURE — 83735 ASSAY OF MAGNESIUM: CPT

## 2022-01-06 PROCEDURE — 82435 ASSAY OF BLOOD CHLORIDE: CPT

## 2022-01-06 PROCEDURE — 82330 ASSAY OF CALCIUM: CPT

## 2022-01-06 PROCEDURE — 85610 PROTHROMBIN TIME: CPT

## 2022-01-06 PROCEDURE — 85379 FIBRIN DEGRADATION QUANT: CPT

## 2022-01-06 PROCEDURE — 88313 SPECIAL STAINS GROUP 2: CPT

## 2022-01-06 PROCEDURE — 82962 GLUCOSE BLOOD TEST: CPT

## 2022-01-06 PROCEDURE — 36415 COLL VENOUS BLD VENIPUNCTURE: CPT

## 2022-01-06 PROCEDURE — 83880 ASSAY OF NATRIURETIC PEPTIDE: CPT

## 2022-01-06 PROCEDURE — 82803 BLOOD GASES ANY COMBINATION: CPT

## 2022-01-06 PROCEDURE — 83605 ASSAY OF LACTIC ACID: CPT

## 2022-01-06 PROCEDURE — U0005: CPT

## 2022-01-06 PROCEDURE — 71045 X-RAY EXAM CHEST 1 VIEW: CPT

## 2022-01-06 PROCEDURE — 85730 THROMBOPLASTIN TIME PARTIAL: CPT

## 2022-01-06 PROCEDURE — U0003: CPT

## 2022-01-06 PROCEDURE — 84100 ASSAY OF PHOSPHORUS: CPT

## 2022-01-06 PROCEDURE — 84132 ASSAY OF SERUM POTASSIUM: CPT

## 2022-01-06 PROCEDURE — 86803 HEPATITIS C AB TEST: CPT

## 2022-01-06 PROCEDURE — 97161 PT EVAL LOW COMPLEX 20 MIN: CPT

## 2022-01-06 PROCEDURE — 96372 THER/PROPH/DIAG INJ SC/IM: CPT

## 2022-01-24 NOTE — CHART NOTE - NSCHARTNOTEFT_GEN_A_CORE
Code blue called at 4:35PM. Per RN, patient was being moved to [facility] for transport to radiology. Compressions started, epinephrine given per ACLS protocol. Initial rhythm appeared PEA. On review of am labs no acute abnormalities were noted, calcium 1g was given. Patient intubated by anesthesia. Rhythm converted to ventricular tachycardia and defibrillation given at 200 joules x1. Amio 300 mg given x1. ROSC was achieved at next pulse check after defibrillation. Epinephrine total given was x5. She was started on levophed however no blood pressure was able to be obtained. She lost pulse shortly afterwards and compressions were resumed. Rhythm when pulse was lost was PEA. Patient received several more rounds of compression per ACLS protocol. Total time of compressions was approximately 30 minutes. at 5:09PM patient did not have any pulses, heart sounds, or spontaneous breaths. Pupils were fixed and dilated.    Family member present at start of code and for its duration.
addendum: Patient was found to have acute hypoxic resp failure that led to her death. An autopsy was planned to ascertain the reason per my conversation with pathology.
Called by RN, without pulse and spontaneous breath. As per RN patient was ambulating to stretcher became short of breath, urinated and then LOC. CPR was started immediately and code blue called. Please see code sheet for course of treatment. At 1709 patient pronounce with cardiopulmonary arrest secondary to hypoxia, as per Dr Scales.  Family, nephew was present during code and [place] patient's other nephew called notified. Family is requesting an autopsy.  Attending  Dr Boogie notified.    Elodia Link NP

## 2023-05-24 NOTE — PHYSICAL THERAPY INITIAL EVALUATION ADULT - THERAPY FREQUENCY, PT EVAL
Patient received awake and alert, seated upright in bedside chair, +IV, grandmother present, patient engaged in conversation throughout 1-2x/week Patient received awake and alert, with audiologist. Alternating between standing and sitting on floor throughout evaluation. Grandmother present. Patient able to engage in back-and-forth conversation with clinician and grandmother throughout evaluation. Able to communicate all basic/complex wants/needs efficiently. No dysarthria noted; speech clear and coherent.

## 2024-02-21 NOTE — ED ADULT NURSE NOTE - DRUG PRE-SCREENING (DAST -1)
History Of Present Illness  Amena Chapa is a 89 y.o. female, [facility] resident, with history of dementia presenting with bradycardia. Pt is not able to provide any history at all. Information is therefore as per EMR and ED provider. Per ED provider, patient is under hospice care at the [facility]. She reportedly complained of pain and was given naproxen. When they went back to check on her, they found that she was bradycardic to the 30s and not responding appropriately. They therefore called EMS who arrived and began transcutaneous pacing. Eventually, she was able to come of TCP.  Family would still like her to be hospice and are opposed to any cardiac work up .      Past Medical History  Past Medical History:   Diagnosis Date    Hyperlipidemia, unspecified     Dyslipidemia    Nonrheumatic aortic (valve) stenosis     Severe aortic stenosis    Personal history of other diseases of the digestive system     History of gastroesophageal reflux (GERD)    Personal history of other diseases of the musculoskeletal system and connective tissue     Personal history of arthritis       Past Surgical History  Past Surgical History:   Procedure Laterality Date    HYSTERECTOMY  03/14/2014    Hysterectomy    OTHER SURGICAL HISTORY  03/14/2014    Shoulder Surgery Left    OTHER SURGICAL HISTORY  01/29/2021    Coronary artery stent placement    OTHER SURGICAL HISTORY  01/29/2021    Aortic valve replacement    US GUIDED PERCUTANEOUS PERITONEAL OR RETROPERITONEAL FLUID COLLECTION DRAINAGE  12/4/2023    US GUIDED PERCUTANEOUS PERITONEAL OR RETROPERITONEAL FLUID COLLECTION DRAINAGE 12/4/2023 Tricia Coelho MD [facility]        Social History  She reports that she has never smoked. She has never used smokeless tobacco. She reports that she does not currently use alcohol after a past usage of about 1.0 standard drink of alcohol per week. She reports that she does not use drugs.    Family History  Positive for heart disease     Allergies  Patient has no  "known allergies.    Review of Systems   Reason unable to perform ROS: Pt is disoriented.        Physical Exam  Constitutional:       General: She is not in acute distress.     Appearance: She is not ill-appearing, toxic-appearing or diaphoretic.      Comments: Elderly female who is confused and disoriented   HENT:      Head: Normocephalic and atraumatic.      Nose: Nose normal.      Mouth/Throat:      Mouth: Mucous membranes are dry.      Pharynx: Oropharynx is clear. No oropharyngeal exudate or posterior oropharyngeal erythema.   Eyes:      General: No scleral icterus.        Right eye: No discharge.         Left eye: No discharge.      Conjunctiva/sclera: Conjunctivae normal.   Cardiovascular:      Rate and Rhythm: Normal rate and regular rhythm.      Heart sounds: No murmur heard.  Pulmonary:      Breath sounds: No wheezing, rhonchi or rales.   Abdominal:      General: There is no distension.      Palpations: There is no mass.      Tenderness: There is no abdominal tenderness. There is no guarding or rebound.   Musculoskeletal:      Cervical back: Neck supple.      Right lower leg: No edema.      Left lower leg: No edema.   Lymphadenopathy:      Cervical: No cervical adenopathy.   Skin:     General: Skin is warm and dry.   Neurological:      General: No focal deficit present.      Mental Status: She is alert. She is disoriented.   Psychiatric:         Mood and Affect: Mood normal.         Behavior: Behavior normal.          Last Recorded Vitals  Blood pressure 128/63, pulse 55, temperature 35.9 °C (96.6 °F), temperature source Tympanic, resp. rate 19, height 1.575 m (5' 2\"), weight 57 kg (125 lb 10.6 oz), SpO2 95 %.    Relevant Results   Latest Reference Range & Units 02/20/24 21:19   GLUCOSE 74 - 99 mg/dL 167 (H)   SODIUM 136 - 145 mmol/L 124 (L)   POTASSIUM 3.5 - 5.3 mmol/L 5.6 (H)   CHLORIDE 98 - 107 mmol/L 88 (L)   Bicarbonate 21 - 32 mmol/L 20 (L)   Anion Gap mmol/L 22   Blood Urea Nitrogen 6 - 23 mg/dL 30 " (H)   Creatinine 0.50 - 1.05 mg/dL 1.71 (H)   EGFR >60 mL/min/1.73m*2 28 (L)   Calcium 8.6 - 10.3 mg/dL 8.5 (L)   Albumin 3.4 - 5.0 g/dL 3.8   Alkaline Phosphatase 33 - 136 U/L 146 (H)   ALT 7 - 45 U/L 268 (H)   AST 9 - 39 U/L 253 (H)   Bilirubin Total 0.0 - 1.2 mg/dL 0.9   Total Protein 6.4 - 8.2 g/dL 7.6   MAGNESIUM 1.60 - 2.40 mg/dL 3.09 (H)   Troponin I, High Sensitivity 0 - 13 ng/L 2,427 (HH)   Thyroxine, Free 0.61 - 1.12 ng/dL 0.78   Thyroid Stimulating Hormone 0.44 - 3.98 mIU/L 40.10 (H)   WBC 4.4 - 11.3 x10*3/uL 9.8   nRBC 0.0 - 0.0 /100 WBCs 0.0   RBC 4.00 - 5.20 x10*6/uL 3.99 (L)   HEMOGLOBIN 12.0 - 16.0 g/dL 10.2 (L)   HEMATOCRIT 36.0 - 46.0 % 32.7 (L)   MCV 80 - 100 fL 82   MCH 26.0 - 34.0 pg 25.6 (L)   MCHC 32.0 - 36.0 g/dL 31.2 (L)   RED CELL DISTRIBUTION WIDTH 11.5 - 14.5 % 17.0 (H)   Platelets 150 - 450 x10*3/uL 197   Neutrophils % 40.0 - 80.0 % 50.0   Immature Granulocytes %, Automated 0.0 - 0.9 % 0.8   Lymphocytes % 13.0 - 44.0 % 40.0   Monocytes % 2.0 - 10.0 % 8.8   Eosinophils % 0.0 - 6.0 % 0.1   Basophils % 0.0 - 2.0 % 0.3   Neutrophils Absolute 1.60 - 5.50 x10*3/uL 4.89   Immature Granulocytes Absolute, Automated 0.00 - 0.50 x10*3/uL 0.08   Lymphocytes Absolute 0.80 - 3.00 x10*3/uL 3.91 (H)   Monocytes Absolute 0.05 - 0.80 x10*3/uL 0.86 (H)   Eosinophils Absolute 0.00 - 0.40 x10*3/uL 0.01   Basophils Absolute 0.00 - 0.10 x10*3/uL 0.03   POCT pH, Venous 7.33 - 7.43 pH 7.21 (LL)   POCT pCO2, Venous 41 - 51 mm Hg 52 (H)   POCT pO2, Venous 35 - 45 mm Hg 22 (L)   POCT SO2, Venous 45 - 75 % 24 (L)   POCT Oxy Hemoglobin, Venous 45.0 - 75.0 % 23.7 (L)   POCT Hematocrit Calculated, Venous 36.0 - 46.0 % 36.0   POCT Sodium, Venous 136 - 145 mmol/L 122 (L)   POCT Potassium, Venous 3.5 - 5.3 mmol/L 5.2   POCT Chloride, Venous 98 - 107 mmol/L 91 (L)   POCT Ionized Calicum, Venous 1.10 - 1.33 mmol/L 1.04 (L)   POCT Glucose, Venous 74 - 99 mg/dL 171 (H)   POCT Lactate, Venous 0.4 - 2.0 mmol/L 6.6 (HH)   POCT  Base Excess, Venous -2.0 - 3.0 mmol/L -7.2 (L)   POCT HCO3 Calculated, Venous 22.0 - 26.0 mmol/L 20.8 (L)   POCT Hemoglobin, Venous 12.0 - 16.0 g/dL 11.9 (L)   POCT Anion Gap, Venous 10.0 - 25.0 mmol/L 15.0   FiO2 % 32   Patient Temperature  COMMENT ONLY   (HH): Data is critically high  (LL): Data is critically low  (H): Data is abnormally high  (L): Data is abnormally low     Assessment/Plan   Bradycardia  Pt is hospice.  Family do not want to pursue any workup at this time  Hospice and supportive care    Elevated troponin  Likely NSTEMI  Pt is hospice and family want comfort measures    Hyponatremia  Most likely related to volume depletion  Fluids    Hypothyroidism  TSH elevated  Increase Levothyroxine      I spent 60 minutes in the professional and overall care of this patient.      Davon Moise MD     Statement Selected

## 2024-10-14 NOTE — ED ADULT NURSE NOTE - NS ED NURSE DISCH DISPOSITION
Continue hydroxyzine as needed, can take 1-4 pills at a time as needed     Please look for new mental health providers in insurance network - can send referral if needed     GoodRx discount for inhaler  
Admitted

## 2025-07-05 NOTE — PROVIDER CONTACT NOTE (CHANGE IN STATUS NOTIFICATION) - RECOMMENDATIONS
"Subjective:      Patient ID: Alvarez Moore is a 12 y.o. female.    Vitals:  height is 5' 0.05" (1.525 m) and weight is 75.4 kg (166 lb 3.6 oz). Her oral temperature is 99.1 °F (37.3 °C). Her blood pressure is 108/71 and her pulse is 111 (abnormal). Her respiration is 21 and oxygen saturation is 97%.     Chief Complaint: Cough    12-year-old female here for fever, cough, sore throat, congestion x2 days.  She reports she has been taken Mucinex, Tylenol, Motrin with no improvement.  She reports normal appetite. She denies SOB, GI complaints, or any other associated symptoms.     Cough  This is a new problem. Episode onset: 2 days ago. The problem has been unchanged. The problem occurs constantly. The cough is Non-productive. Associated symptoms include chills, a fever, myalgias, nasal congestion and a sore throat. Pertinent negatives include no chest pain, ear pain, headaches, hemoptysis, rash or sweats. Nothing aggravates the symptoms. She has tried OTC cough suppressant for the symptoms. The treatment provided no relief. There is no history of environmental allergies.       Constitution: Positive for chills and fever. Negative for activity change, appetite change, sweating and fatigue.   HENT:  Positive for sore throat. Negative for ear pain, ear discharge, foreign body in ear, sinus pain, sinus pressure, trouble swallowing and voice change.    Neck: Negative for neck pain, neck stiffness and painful lymph nodes.   Cardiovascular:  Negative for chest trauma and chest pain.   Eyes:  Negative for eye trauma, foreign body in eye, eye discharge and eye itching.   Respiratory:  Positive for cough. Negative for sleep apnea, chest tightness, sputum production and bloody sputum.    Gastrointestinal:  Negative for abdominal trauma, abdominal pain and abdominal bloating.   Endocrine: hair loss, cold intolerance and heat intolerance.   Genitourinary:  Negative for dysuria, frequency and urgency.   Musculoskeletal:  Positive " for muscle ache. Negative for pain, trauma, back pain, muscle cramps and history of spine disorder.   Skin:  Negative for color change, pale and rash.   Allergic/Immunologic: Negative for environmental allergies and seasonal allergies.   Neurological:  Negative for dizziness, history of vertigo, headaches, disorientation and altered mental status.   Hematologic/Lymphatic: Negative for swollen lymph nodes and easy bruising/bleeding. Does not bruise/bleed easily.   Psychiatric/Behavioral:  Negative for altered mental status, disorientation, confusion and agitation.       Objective:     Physical Exam   Constitutional: She appears well-developed. She is active and cooperative.  Non-toxic appearance. She does not appear ill. No distress.   HENT:   Head: Normocephalic and atraumatic. No signs of injury. There is normal jaw occlusion.   Ears:   Right Ear: Tympanic membrane, external ear and ear canal normal.   Left Ear: Tympanic membrane, external ear and ear canal normal.   Nose: Congestion present. No signs of injury. No epistaxis in the right nostril. No epistaxis in the left nostril.   Mouth/Throat: Mucous membranes are moist. Oropharynx is clear.   Eyes: Conjunctivae and lids are normal. Visual tracking is normal. Right eye exhibits no discharge and no exudate. Left eye exhibits no discharge and no exudate. No scleral icterus.   Neck: Trachea normal. Neck supple. No neck rigidity present.   Cardiovascular: Normal rate, regular rhythm, normal heart sounds and normal pulses. Pulses are strong.   Pulmonary/Chest: Effort normal and breath sounds normal. No nasal flaring or stridor. No respiratory distress. Air movement is not decreased. She has no wheezes. She has no rhonchi. She has no rales. She exhibits no retraction.   Abdominal: Bowel sounds are normal. She exhibits no distension. Soft. There is no abdominal tenderness.   Musculoskeletal: Normal range of motion.         General: No tenderness, deformity or signs of  injury. Normal range of motion.   Neurological: She is alert.   Skin: Skin is warm, dry, not diaphoretic and no rash. Capillary refill takes less than 2 seconds. No abrasion, No burn and No bruising   Psychiatric: Her speech is normal and behavior is normal.   Nursing note and vitals reviewed.    Results for orders placed or performed in visit on 07/05/25   POCT Influenza A/B MOLECULAR    Collection Time: 07/05/25 12:53 PM   Result Value Ref Range    POC Molecular Influenza A Ag Negative Negative    POC Molecular Influenza B Ag Positive (A) Negative     Acceptable Yes    SARS Coronavirus 2 Antigen, POCT Manual Read    Collection Time: 07/05/25 12:54 PM   Result Value Ref Range    SARS Coronavirus 2 Antigen Negative Negative, Presumptive Negative     Acceptable Yes         Assessment:     1. Influenza B    2. Cough, unspecified type        Plan:       Influenza B  -     oseltamivir (TAMIFLU) 75 MG capsule; Take 1 capsule (75 mg total) by mouth 2 (two) times daily. for 5 days  Dispense: 10 capsule; Refill: 0    Cough, unspecified type  -     POCT Influenza A/B MOLECULAR  -     SARS Coronavirus 2 Antigen, POCT Manual Read  -     brompheniramine-pseudoeph-DM (BROMFED DM) 2-30-10 mg/5 mL Syrp; Take 5 mLs by mouth 3 (three) times daily as needed (cough).  Dispense: 118 mL; Refill: 0                     See cardiac arrest data sheet.